# Patient Record
Sex: MALE | Race: WHITE | NOT HISPANIC OR LATINO | URBAN - METROPOLITAN AREA
[De-identification: names, ages, dates, MRNs, and addresses within clinical notes are randomized per-mention and may not be internally consistent; named-entity substitution may affect disease eponyms.]

---

## 2018-11-21 ENCOUNTER — APPOINTMENT (RX ONLY)
Dept: URBAN - METROPOLITAN AREA CLINIC 344 | Facility: CLINIC | Age: 63
Setting detail: DERMATOLOGY
End: 2018-11-21

## 2018-11-21 DIAGNOSIS — L40.0 PSORIASIS VULGARIS: ICD-10-CM | Status: INADEQUATELY CONTROLLED

## 2018-11-21 DIAGNOSIS — Z85.828 PERSONAL HISTORY OF OTHER MALIGNANT NEOPLASM OF SKIN: ICD-10-CM | Status: RESOLVED

## 2018-11-21 DIAGNOSIS — D485 NEOPLASM OF UNCERTAIN BEHAVIOR OF SKIN: ICD-10-CM

## 2018-11-21 PROBLEM — C44.529 SQUAMOUS CELL CARCINOMA OF SKIN OF OTHER PART OF TRUNK: Status: ACTIVE | Noted: 2018-11-21

## 2018-11-21 PROBLEM — D48.5 NEOPLASM OF UNCERTAIN BEHAVIOR OF SKIN: Status: ACTIVE | Noted: 2018-11-21

## 2018-11-21 PROCEDURE — 99203 OFFICE O/P NEW LOW 30 MIN: CPT | Mod: 25

## 2018-11-21 PROCEDURE — ? COSENTYX INITIATION

## 2018-11-21 PROCEDURE — ? BIOPSY BY SHAVE METHOD

## 2018-11-21 PROCEDURE — ? ORDER TESTS

## 2018-11-21 PROCEDURE — ? OTHER

## 2018-11-21 PROCEDURE — 11100: CPT

## 2018-11-21 PROCEDURE — ? COUNSELING

## 2018-11-21 ASSESSMENT — BSA PSORIASIS: % BODY COVERED IN PSORIASIS: 100

## 2018-11-21 ASSESSMENT — LOCATION SIMPLE DESCRIPTION DERM
LOCATION SIMPLE: LEFT EYEBROW
LOCATION SIMPLE: RIGHT UPPER BACK

## 2018-11-21 ASSESSMENT — LOCATION DETAILED DESCRIPTION DERM
LOCATION DETAILED: LEFT CENTRAL EYEBROW
LOCATION DETAILED: RIGHT SUPERIOR MEDIAL UPPER BACK

## 2018-11-21 ASSESSMENT — PGA PSORIASIS: PGA PSORIASIS: SEVERE (SEVERE PLAQUE ELEVATION, DUSKY TO DEEP ERYTHEMA, VERY THICK TENACIOUS SCALE PREDOMINATES)

## 2018-11-21 ASSESSMENT — LOCATION ZONE DERM
LOCATION ZONE: TRUNK
LOCATION ZONE: FACE

## 2018-11-21 NOTE — PROCEDURE: OTHER
Other (Free Text): Photo taken today and Telfa bandage applied . Dr. Soares will be contacted regarding surgical site by [facility]. Other (Free Text): Photo taken today and Telfa bandage applied . Dr. Soares will be contacted regarding surgical site by [facility].

## 2018-11-21 NOTE — PROCEDURE: OTHER
Other (Free Text): Patient has had over 60 SCC Removed. Most recently two weeks ago with Dr. Mirela Soares. (Right forearm and right hip) patient has a history of metastatic SCC (3 years ago) in left groin that was treated with chemo and radiation. He is being monitored regularly by oncologist at Cancer center. Surgery could not be performed due to proximity of femoral artery .

## 2018-11-21 NOTE — PROCEDURE: BIOPSY BY SHAVE METHOD
Render Post-Care Instructions In Note?: no
Notification Instructions: Patient will be notified of biopsy results. However, patient instructed to call the office if not contacted within 2 weeks.
Type Of Destruction Used: Curettage
Anesthesia Volume In Cc (Will Not Render If 0): 1
Cryotherapy Text: The wound bed was treated with cryotherapy after the biopsy was performed.
Anesthesia Type: 2% lidocaine without epinephrine
Electrodesiccation And Curettage Text: The wound bed was treated with electrodesiccation and curettage after the biopsy was performed.
Wound Care: Aquaphor
Dressing: bandage
Curettage Text: The wound bed was treated with curettage after the biopsy was performed.
Billing Type: Third-Party Bill
Biopsy Method: Dermablade
Silver Nitrate Text: The wound bed was treated with silver nitrate after the biopsy was performed.
Detail Level: Detailed
Biopsy Type: H and E
Electrodesiccation Text: The wound bed was treated with electrodesiccation after the biopsy was performed.
Post-Care Instructions: I reviewed with the patient in detail post-care instructions. Patient is to keep the biopsy site dry overnight, and then apply bacitracin once daily until healed. Patient may apply hydrogen peroxide soaks to remove any crusting.
Size Of Lesion In Cm: 0
Hemostasis: Electrocautery
Was A Bandage Applied: Yes
Consent: Verbal consent was obtained and risks were reviewed including but not limited to scarring, infection, bleeding, scabbing, incomplete removal, nerve damage and allergy to anesthesia.
Depth Of Biopsy: dermis

## 2018-11-21 NOTE — HPI: EVALUATION OF SKIN LESION(S)
Hpi Title: Evaluation of Skin Lesions
How Severe Are Your Spot(S)?: severe
Have Your Spot(S) Been Treated In The Past?: has been treated
Additional History: Patient has had over 60 squamous cell cancers. patiwnt suffered Metastatic squamous cell carcinoma  in the left groin 2015. This lesion was too close to the femoral artery and is being monitored by oncologist at the cancer cancer. He rece therapy and radiation.  He states he is P[employer] treatments for many years due to his severe plaque psoriasis . He has been on the following biologics throughout the course of his illness. Enbrel,Humira,Stelara and Remicade. Due to the use of Remicade patient had severe infection with in 24 hours of use and had to have a toe amputated . He had. SCC on the left thigh in March and suffered an arterial bleed. He most recently had surgery with Dr. Mirela Soares  two weeks ago on  the right forearm and right hip and margins were cleared. He is currently using Triamcinolone cream to treat his severe plaque psoriasis

## 2018-11-21 NOTE — PROCEDURE: COSENTYX INITIATION
Is Soriatane Contraindicated?: No
Detail Level: Zone
Cosentyx Dosing: 300 mg SC week 0, 1, 2, 3, and 4 then every 4 weeks after that
Diagnosis (Required): Psoriasis
Cosentyx Monitoring Guidelines: A yearly test for tuberculosis is required while taking Cosentyx.
Pregnancy And Lactation Warning Text: This medication is Pregnancy Category B and is considered safe during pregnancy. It is unknown if this medication is excreted in breast milk.

## 2018-11-21 NOTE — PROCEDURE: OTHER
Other (Free Text): Referred to DE for possible options given his history of failure/problems with previous biologics.  Would want to consider using Acitretin in conjunction with biologic.  \\nPatient is currently using Triamcinolone cream

## 2018-11-21 NOTE — PROCEDURE: ORDER TESTS
Billing Type: Third-Party Bill
Bill For Surgical Tray: no
Billing Type: Client Bill
Expected Date Of Service: 11/21/2018

## 2018-11-26 ENCOUNTER — RX ONLY (OUTPATIENT)
Age: 63
Setting detail: RX ONLY
End: 2018-11-26

## 2018-11-26 RX ORDER — DOXYCYCLINE HYCLATE 100 MG/1
TABLET, COATED ORAL
Qty: 20 | Refills: 0 | Status: ERX

## 2018-12-11 ENCOUNTER — APPOINTMENT (RX ONLY)
Dept: URBAN - METROPOLITAN AREA CLINIC 347 | Facility: CLINIC | Age: 63
Setting detail: DERMATOLOGY
End: 2018-12-11

## 2018-12-11 DIAGNOSIS — A49.02 METHICILLIN RESISTANT STAPHYLOCOCCUS AUREUS INFECTION, UNSPECIFIED SITE: ICD-10-CM

## 2018-12-11 DIAGNOSIS — L40.0 PSORIASIS VULGARIS: ICD-10-CM

## 2018-12-11 PROBLEM — B95.62 METHICILLIN RESISTANT STAPHYLOCOCCUS AUREUS INFECTION AS THE CAUSE OF DISEASES CLASSIFIED ELSEWHERE: Status: ACTIVE | Noted: 2018-12-11

## 2018-12-11 PROCEDURE — 99214 OFFICE O/P EST MOD 30 MIN: CPT

## 2018-12-11 PROCEDURE — ? OTHER

## 2018-12-11 PROCEDURE — ? ORDER TESTS

## 2018-12-11 PROCEDURE — ? COUNSELING

## 2018-12-11 ASSESSMENT — PGA PSORIASIS: PGA PSORIASIS: SEVERE (SEVERE PLAQUE ELEVATION, DUSKY TO DEEP ERYTHEMA, VERY THICK TENACIOUS SCALE PREDOMINATES)

## 2018-12-11 ASSESSMENT — LOCATION SIMPLE DESCRIPTION DERM
LOCATION SIMPLE: ABDOMEN
LOCATION SIMPLE: RIGHT UPPER BACK

## 2018-12-11 ASSESSMENT — BSA PSORIASIS: % BODY COVERED IN PSORIASIS: 99

## 2018-12-11 ASSESSMENT — LOCATION DETAILED DESCRIPTION DERM
LOCATION DETAILED: RIGHT SUPERIOR MEDIAL UPPER BACK
LOCATION DETAILED: RIGHT LATERAL ABDOMEN

## 2018-12-11 ASSESSMENT — LOCATION ZONE DERM: LOCATION ZONE: TRUNK

## 2018-12-11 NOTE — PROCEDURE: OTHER
Other (Free Text): Previously failed, MTX, psoriatane, remicade, Humira and Stelara
Detail Level: Detailed
Note Text (......Xxx Chief Complaint.): This diagnosis correlates with the

## 2018-12-19 ENCOUNTER — APPOINTMENT (RX ONLY)
Dept: URBAN - METROPOLITAN AREA CLINIC 344 | Facility: CLINIC | Age: 63
Setting detail: DERMATOLOGY
End: 2018-12-19

## 2018-12-19 DIAGNOSIS — L40.0 PSORIASIS VULGARIS: ICD-10-CM

## 2018-12-19 PROBLEM — M12.9 ARTHROPATHY, UNSPECIFIED: Status: ACTIVE | Noted: 2018-12-19

## 2018-12-19 PROCEDURE — ? COUNSELING

## 2018-12-19 PROCEDURE — ? TREATMENT REGIMEN

## 2018-12-19 PROCEDURE — ? TALTZ INITIATION

## 2018-12-19 PROCEDURE — 99213 OFFICE O/P EST LOW 20 MIN: CPT

## 2018-12-19 PROCEDURE — ? OTHER

## 2018-12-19 ASSESSMENT — PGA PSORIASIS: PGA PSORIASIS: SEVERE (SEVERE PLAQUE ELEVATION, DUSKY TO DEEP ERYTHEMA, VERY THICK TENACIOUS SCALE PREDOMINATES)

## 2018-12-19 ASSESSMENT — BSA PSORIASIS: % BODY COVERED IN PSORIASIS: 95

## 2018-12-19 NOTE — PROCEDURE: OTHER
Other (Free Text): D/w patient addition of acitretin for his history of multiple SCCs.  Clarified with pt that this did not work for his psoriasis, but was not on any length of time.  Will see how first month of Taltz goes and then add in.  Will need ED&C of SCCis L forehead.

## 2018-12-19 NOTE — PROCEDURE: TREATMENT REGIMEN
Detail Level: Simple
Action 1: Start
Other Instructions: Enrollment form completed; will fax upon lab results\\n\\nPatient will need labs done annually & patient will need to follow-up with PCP for wellness exam annually
Action 2: Continue

## 2018-12-19 NOTE — PROCEDURE: TALTZ INITIATION
Pregnancy And Lactation Warning Text: The risk during pregnancy and breastfeeding is uncertain with this medication.
Is Cyclosporine Contraindicated?: No
Detail Level: Zone
Diagnosis (Required): Psoriasis
Taltz Dosing: 160mg SC x 1 at weeks 0 then 80mg SC at weeks 2, 4, 6, 8, 10 and 12 then 80mg SC every four weeks
Taltz Monitoring Guidelines: A yearly test for tuberculosis is required while taking Taltz.

## 2018-12-19 NOTE — PROCEDURE: OTHER
Other (Free Text): Two Samples Injected today\\nLot #- [id number]\\nExp- 12/2018\\n\\nLot#- [id number]\\nExp- 12/2018 Other (Free Text): Two Samples Injected today\\nLot #- [id number]\\nExp- 12/2018\\n\\nLot#- [id number]\\nExp- 12/2018

## 2019-01-02 ENCOUNTER — APPOINTMENT (RX ONLY)
Dept: URBAN - METROPOLITAN AREA CLINIC 344 | Facility: CLINIC | Age: 64
Setting detail: DERMATOLOGY
End: 2019-01-02

## 2019-01-02 DIAGNOSIS — D485 NEOPLASM OF UNCERTAIN BEHAVIOR OF SKIN: ICD-10-CM

## 2019-01-02 DIAGNOSIS — L40.0 PSORIASIS VULGARIS: ICD-10-CM

## 2019-01-02 PROBLEM — E13.9 OTHER SPECIFIED DIABETES MELLITUS WITHOUT COMPLICATIONS: Status: ACTIVE | Noted: 2019-01-02

## 2019-01-02 PROBLEM — D48.5 NEOPLASM OF UNCERTAIN BEHAVIOR OF SKIN: Status: ACTIVE | Noted: 2019-01-02

## 2019-01-02 PROBLEM — D04.39 CARCINOMA IN SITU OF SKIN OF OTHER PARTS OF FACE: Status: ACTIVE | Noted: 2019-01-02

## 2019-01-02 PROCEDURE — 99213 OFFICE O/P EST LOW 20 MIN: CPT

## 2019-01-02 PROCEDURE — ? TREATMENT REGIMEN

## 2019-01-02 PROCEDURE — ? OTHER

## 2019-01-02 PROCEDURE — ? COUNSELING

## 2019-01-02 PROCEDURE — ? DEFER

## 2019-01-02 PROCEDURE — ? TALTZ MONITORING

## 2019-01-02 ASSESSMENT — PGA PSORIASIS: PGA PSORIASIS: MILD (MILD PLAQUE ELEVATION, LIGHT ERYTHEMA, FINE SCALE PREDOMINATES)

## 2019-01-02 ASSESSMENT — LOCATION SIMPLE DESCRIPTION DERM: LOCATION SIMPLE: RIGHT FOREHEAD

## 2019-01-02 ASSESSMENT — BSA PSORIASIS: % BODY COVERED IN PSORIASIS: 85

## 2019-01-02 ASSESSMENT — LOCATION ZONE DERM: LOCATION ZONE: FACE

## 2019-01-02 ASSESSMENT — LOCATION DETAILED DESCRIPTION DERM: LOCATION DETAILED: RIGHT FOREHEAD

## 2019-01-02 NOTE — PROCEDURE: TREATMENT REGIMEN
Continue Regimen: Jessica
Action 3: Continue
Detail Level: Simple
Other Instructions: Patient will need labs done annually & patient will need to follow-up with PCP for wellness exam annually
Detail Level: Zone
Plan: Will plan to start Acetrein 25mg-Take one tablet once daily once he is more established on Taltz.

## 2019-01-02 NOTE — PROCEDURE: OTHER
Other (Free Text): sample 80mg injected today in the abdomen\\nLot: [id number]\\nExp: 12/2018\\n\\nPhotos taken today.
Note Text (......Xxx Chief Complaint.): This diagnosis correlates with the
Detail Level: Detailed
Other (Free Text): Plan to treat with ED&C at 2-4 weeks follow up
Other (Free Text): Photo taken today

## 2019-01-16 ENCOUNTER — APPOINTMENT (RX ONLY)
Dept: URBAN - METROPOLITAN AREA CLINIC 344 | Facility: CLINIC | Age: 64
Setting detail: DERMATOLOGY
End: 2019-01-16

## 2019-01-16 DIAGNOSIS — L40.0 PSORIASIS VULGARIS: ICD-10-CM | Status: IMPROVED

## 2019-01-16 DIAGNOSIS — D485 NEOPLASM OF UNCERTAIN BEHAVIOR OF SKIN: ICD-10-CM

## 2019-01-16 PROBLEM — D48.5 NEOPLASM OF UNCERTAIN BEHAVIOR OF SKIN: Status: ACTIVE | Noted: 2019-01-16

## 2019-01-16 PROBLEM — D04.39 CARCINOMA IN SITU OF SKIN OF OTHER PARTS OF FACE: Status: ACTIVE | Noted: 2019-01-16

## 2019-01-16 PROBLEM — L30.9 DERMATITIS, UNSPECIFIED: Status: ACTIVE | Noted: 2019-01-16

## 2019-01-16 PROCEDURE — ? CURETTAGE AND DESTRUCTION

## 2019-01-16 PROCEDURE — ? TREATMENT REGIMEN

## 2019-01-16 PROCEDURE — 17283 DSTR MAL LS F/E/E/N/L/M2.1-3: CPT

## 2019-01-16 PROCEDURE — ? BIOPSY BY SHAVE METHOD

## 2019-01-16 PROCEDURE — 99213 OFFICE O/P EST LOW 20 MIN: CPT | Mod: 25

## 2019-01-16 PROCEDURE — 11102 TANGNTL BX SKIN SINGLE LES: CPT | Mod: 59

## 2019-01-16 PROCEDURE — ? COUNSELING

## 2019-01-16 PROCEDURE — ? TALTZ MONITORING

## 2019-01-16 ASSESSMENT — PGA PSORIASIS: PGA PSORIASIS: MODERATE (MODERATE PLAQUE ELEVATION, MODERATE ERYTHEMA, COARSE SCALE PREDOMINATES)

## 2019-01-16 ASSESSMENT — LOCATION ZONE DERM: LOCATION ZONE: FACE

## 2019-01-16 ASSESSMENT — BSA PSORIASIS: % BODY COVERED IN PSORIASIS: 85

## 2019-01-16 ASSESSMENT — LOCATION DETAILED DESCRIPTION DERM: LOCATION DETAILED: RIGHT SUPERIOR FOREHEAD

## 2019-01-16 ASSESSMENT — LOCATION SIMPLE DESCRIPTION DERM: LOCATION SIMPLE: RIGHT FOREHEAD

## 2019-01-16 NOTE — PROCEDURE: BIOPSY BY SHAVE METHOD
Dressing: bandage
Size Of Lesion In Cm: 0
Cryotherapy Text: The wound bed was treated with cryotherapy after the biopsy was performed.
Depth Of Biopsy: dermis
Silver Nitrate Text: The wound bed was treated with silver nitrate after the biopsy was performed.
Post-Care Instructions: I reviewed with the patient in detail post-care instructions. Patient is to keep the biopsy site dry overnight, and then apply bacitracin once daily until healed. Patient may apply hydrogen peroxide soaks to remove any crusting.
Biopsy Method: Dermablade
Notification Instructions: Patient will be notified of biopsy results. However, patient instructed to call the office if not contacted within 2 weeks.
Electrodesiccation Text: The wound bed was treated with electrodesiccation after the biopsy was performed.
Curettage Text: The wound bed was treated with curettage after the biopsy was performed.
Electrodesiccation And Curettage Text: The wound bed was treated with electrodesiccation and curettage after the biopsy was performed.
Type Of Destruction Used: Curettage
Bill 30207 For Specimen Handling/Conveyance To Laboratory?: no
Anesthesia Type: 1% lidocaine with 1:100,000 epinephrine and a 1:10 solution of 8.4% sodium bicarbonate
Wound Care: Aquaphor
Biopsy Type: H and E
Destruction After The Procedure: Yes
Anesthesia Volume In Cc (Will Not Render If 0): 1
Billing Type: Third-Party Bill
Hemostasis: Electrocautery
Detail Level: Detailed
Consent: Verbal consent was obtained and risks were reviewed including but not limited to scarring, infection, bleeding, scabbing, incomplete removal, nerve damage and allergy to anesthesia.

## 2019-01-16 NOTE — PROCEDURE: TALTZ MONITORING
Add High Risk Medication Management Associated Diagnosis?: Yes
Comments: Dose week 4 today (80 mg SC administered in office by patient).
Detail Level: Simple

## 2019-01-16 NOTE — PROCEDURE: TREATMENT REGIMEN
Action 2: Continue
Other Instructions: Patient will need labs done annually & patient will need to follow-up with PCP for wellness exam annually
Continue Regimen: Jessica
Detail Level: Simple

## 2019-02-13 ENCOUNTER — APPOINTMENT (RX ONLY)
Dept: URBAN - METROPOLITAN AREA CLINIC 344 | Facility: CLINIC | Age: 64
Setting detail: DERMATOLOGY
End: 2019-02-13

## 2019-02-13 DIAGNOSIS — L40.0 PSORIASIS VULGARIS: ICD-10-CM | Status: WORSENING

## 2019-02-13 PROBLEM — L29.8 OTHER PRURITUS: Status: ACTIVE | Noted: 2019-02-13

## 2019-02-13 PROBLEM — Z85.828 PERSONAL HISTORY OF OTHER MALIGNANT NEOPLASM OF SKIN: Status: ACTIVE | Noted: 2019-02-13

## 2019-02-13 PROBLEM — D04.39 CARCINOMA IN SITU OF SKIN OF OTHER PARTS OF FACE: Status: ACTIVE | Noted: 2019-02-13

## 2019-02-13 PROCEDURE — ? TREATMENT REGIMEN

## 2019-02-13 PROCEDURE — ? COUNSELING

## 2019-02-13 PROCEDURE — ? TALTZ MONITORING

## 2019-02-13 PROCEDURE — 99213 OFFICE O/P EST LOW 20 MIN: CPT

## 2019-02-13 ASSESSMENT — PGA PSORIASIS: PGA PSORIASIS: MARKED (MARKED PLAQUE ELEVATION, BRIGHT ERYTHEMA, THICK NONTENACIOUS SCALE PREDOMINATES)

## 2019-02-13 ASSESSMENT — BSA PSORIASIS: % BODY COVERED IN PSORIASIS: 85

## 2019-02-13 NOTE — PROCEDURE: TREATMENT REGIMEN
Action 1: Continue
Continue Regimen: Jessica
Other Instructions: Patient will need labs done annually & patient will need to follow-up with PCP for wellness exam annually
Detail Level: Simple

## 2019-02-13 NOTE — PROCEDURE: MIPS QUALITY
Quality 410: Psoriasis Clinical Response To Oral Systemic Or Biologic Mediations: Psoriasis Assessment Tool Documented, Met Specified Benchmark
Quality 265: Biopsy Follow-Up: Biopsy results reviewed, communicated, tracked, and documented
Detail Level: Detailed
Quality 337: Tuberculosis Prevention For Psoriasis And Psoriatic Arthritis Patients On A Biological Immune Response Modifier: Patient has a documented negative annual TB screening.

## 2019-02-13 NOTE — PROCEDURE: TALTZ MONITORING
Comments: Patient due for week 8 injection today. Patient currently undergoing treatment at [facility] for skin infection on R LE. [facility] to consult DE regarding whether or not patient will administer injection. Patient denies any fever or chills.
Detail Level: Simple
Add High Risk Medication Management Associated Diagnosis?: Yes

## 2019-03-20 ENCOUNTER — APPOINTMENT (RX ONLY)
Dept: URBAN - METROPOLITAN AREA CLINIC 344 | Facility: CLINIC | Age: 64
Setting detail: DERMATOLOGY
End: 2019-03-20

## 2019-03-20 DIAGNOSIS — L40.0 PSORIASIS VULGARIS: ICD-10-CM | Status: IMPROVED

## 2019-03-20 DIAGNOSIS — L57.0 ACTINIC KERATOSIS: ICD-10-CM

## 2019-03-20 PROBLEM — C44.722 SQUAMOUS CELL CARCINOMA OF SKIN OF RIGHT LOWER LIMB, INCLUDING HIP: Status: ACTIVE | Noted: 2019-03-20

## 2019-03-20 PROBLEM — L85.3 XEROSIS CUTIS: Status: ACTIVE | Noted: 2019-03-20

## 2019-03-20 PROCEDURE — ? TREATMENT REGIMEN

## 2019-03-20 PROCEDURE — 17003 DESTRUCT PREMALG LES 2-14: CPT

## 2019-03-20 PROCEDURE — ? OTHER

## 2019-03-20 PROCEDURE — ? COUNSELING

## 2019-03-20 PROCEDURE — ? LIQUID NITROGEN

## 2019-03-20 PROCEDURE — ? ORDER TESTS

## 2019-03-20 PROCEDURE — 99214 OFFICE O/P EST MOD 30 MIN: CPT | Mod: 25

## 2019-03-20 PROCEDURE — ? TALTZ MONITORING

## 2019-03-20 PROCEDURE — 17000 DESTRUCT PREMALG LESION: CPT

## 2019-03-20 ASSESSMENT — PGA PSORIASIS: PGA PSORIASIS: MILD (MILD PLAQUE ELEVATION, LIGHT ERYTHEMA, FINE SCALE PREDOMINATES)

## 2019-03-20 ASSESSMENT — LOCATION SIMPLE DESCRIPTION DERM
LOCATION SIMPLE: LEFT FOREHEAD
LOCATION SIMPLE: RIGHT FOREHEAD

## 2019-03-20 ASSESSMENT — BSA PSORIASIS: % BODY COVERED IN PSORIASIS: 85

## 2019-03-20 ASSESSMENT — LOCATION DETAILED DESCRIPTION DERM
LOCATION DETAILED: LEFT INFERIOR FOREHEAD
LOCATION DETAILED: RIGHT FOREHEAD

## 2019-03-20 ASSESSMENT — LOCATION ZONE DERM: LOCATION ZONE: FACE

## 2019-03-20 NOTE — PROCEDURE: LIQUID NITROGEN
Post-Care Instructions: I reviewed with the patient in detail post-care instructions. Patient is to wear sunprotection, and avoid picking at any of the treated lesions. Pt may apply Vaseline to crusted or scabbing areas.
Number Of Freeze-Thaw Cycles: 1 freeze-thaw cycle
Duration Of Freeze Thaw-Cycle (Seconds): 5
Consent: The patient's consent was obtained including but not limited to risks of crusting, scabbing, blistering, scarring, darker or lighter pigmentary change, recurrence, incomplete removal and infection.
Render Post-Care Instructions In Note?: no
Detail Level: Detailed

## 2019-03-20 NOTE — PROCEDURE: OTHER
Other (Free Text): Patient notes recent hypotension as well as decreased urinary output and constipation. Patient to follow up with DE prior to next scheduled home injection (4/11) to assess if he is where she expects him to be at as now on monthly dosing. Patient to have blood work as ordered by his oncologist. Patient to have this set of labs tailored to include tests desired by [facility] (CBC with Diff, CMP, and Urinalysis). Patient to also have wound care notes faxed to our office. Patient also notes minor weight gain 191.6lb at today’s visit w/ leg boot  (about 5lb BW increase accounting for 10 lb boot). Pt psoriasis overall appears thinner plaques versus last visit flare *see 2/13 photos vs 3/20.  Infected wound on R foot controlled per patient but L foot wound in boot for healing.  Pt counseled he needs to see Dr. Soares for SCC on leg (2cm approx).  Pt states he will call today. Other (Free Text): Patient notes recent hypotension as well as decreased urinary output and constipation. Patient to follow up with DE prior to next scheduled home injection (4/11) to assess if he is where she expects him to be at as now on monthly dosing. Patient to have blood work as ordered by his oncologist. Patient to have this set of labs tailored to include tests desired by [facility] (CBC with Diff, CMP, and Urinalysis). Patient to also have wound care notes faxed to our office. Patient also notes minor weight gain 191.6lb at today’s visit w/ leg boot  (about 5lb BW increase accounting for 10 lb boot). Pt psoriasis overall appears thinner plaques versus last visit flare *see 2/13 photos vs 3/20.  Infected wound on R foot controlled per patient but L foot wound in boot for healing.  Pt counseled he needs to see Dr. Soares for SCC on leg (2cm approx).  Pt states he will call today.

## 2019-03-20 NOTE — PROCEDURE: TREATMENT REGIMEN
Continue Regimen: Jessica
Action 3: Continue
Detail Level: Simple
Other Instructions: Patient will need labs done annually & patient will need to follow-up with PCP for wellness exam annually

## 2019-04-04 ENCOUNTER — APPOINTMENT (RX ONLY)
Dept: URBAN - METROPOLITAN AREA CLINIC 347 | Facility: CLINIC | Age: 64
Setting detail: DERMATOLOGY
End: 2019-04-04

## 2019-04-04 DIAGNOSIS — L40.0 PSORIASIS VULGARIS: ICD-10-CM | Status: IMPROVED

## 2019-04-04 PROCEDURE — ? TREATMENT REGIMEN

## 2019-04-04 PROCEDURE — ? COUNSELING

## 2019-04-04 PROCEDURE — ? TALTZ MONITORING

## 2019-04-04 PROCEDURE — 99214 OFFICE O/P EST MOD 30 MIN: CPT

## 2019-04-04 ASSESSMENT — PGA PSORIASIS: PGA PSORIASIS: MARKED (MARKED PLAQUE ELEVATION, BRIGHT ERYTHEMA, THICK NONTENACIOUS SCALE PREDOMINATES)

## 2019-04-04 ASSESSMENT — BSA PSORIASIS: % BODY COVERED IN PSORIASIS: 50

## 2019-04-04 NOTE — PROCEDURE: TREATMENT REGIMEN
Detail Level: Simple
Other Instructions: Patient will need labs done annually & patient will need to follow-up with PCP for wellness exam annually
Action 1: Continue
Continue Regimen: Jessica
Patient/Caregiver provided printed discharge information.

## 2019-05-30 ENCOUNTER — APPOINTMENT (RX ONLY)
Dept: URBAN - METROPOLITAN AREA CLINIC 347 | Facility: CLINIC | Age: 64
Setting detail: DERMATOLOGY
End: 2019-05-30

## 2019-05-30 DIAGNOSIS — L40.0 PSORIASIS VULGARIS: ICD-10-CM | Status: IMPROVED

## 2019-05-30 PROCEDURE — ? COUNSELING

## 2019-05-30 PROCEDURE — ? TREATMENT REGIMEN

## 2019-05-30 PROCEDURE — 99213 OFFICE O/P EST LOW 20 MIN: CPT

## 2019-05-30 PROCEDURE — ? TALTZ MONITORING

## 2019-05-30 ASSESSMENT — LOCATION ZONE DERM: LOCATION ZONE: TRUNK

## 2019-05-30 ASSESSMENT — BSA PSORIASIS: % BODY COVERED IN PSORIASIS: 20

## 2019-05-30 ASSESSMENT — LOCATION SIMPLE DESCRIPTION DERM: LOCATION SIMPLE: LEFT UPPER BACK

## 2019-05-30 ASSESSMENT — LOCATION DETAILED DESCRIPTION DERM: LOCATION DETAILED: LEFT INFERIOR MEDIAL UPPER BACK

## 2019-05-30 NOTE — PROCEDURE: TREATMENT REGIMEN
Action 4: Continue
Other Instructions: Patient will need labs done annually & patient will need to follow-up with PCP for wellness exam annually\\n\\nPatient is UTD on bloodwork as of 05/2019
Samples Given: Cerecade to be applied as needed for flares
Continue Regimen: Jessica
Detail Level: Simple

## 2019-05-30 NOTE — PROCEDURE: TALTZ MONITORING
Detail Level: Simple
Add High Risk Medication Management Associated Diagnosis?: Yes
Comments: Weight- 198.8 pounds

## 2019-05-30 NOTE — PROCEDURE: MIPS QUALITY
Detail Level: Detailed
Quality 337: Tuberculosis Prevention For Psoriasis And Psoriatic Arthritis Patients On A Biological Immune Response Modifier: Patient has a documented negative annual TB screening.
Quality 402: Tobacco Use And Help With Quitting Among Adolescents: Patient screened for tobacco and is a smoker AND received Cessation Counseling
Quality 410: Psoriasis Clinical Response To Oral Systemic Or Biologic Mediations: Psoriasis Assessment Tool Documented, Met Specified Benchmark
Quality 130: Documentation Of Current Medications In The Medical Record: Current Medications Documented

## 2019-08-20 ENCOUNTER — APPOINTMENT (RX ONLY)
Dept: URBAN - METROPOLITAN AREA CLINIC 347 | Facility: CLINIC | Age: 64
Setting detail: DERMATOLOGY
End: 2019-08-20

## 2019-08-20 DIAGNOSIS — L57.0 ACTINIC KERATOSIS: ICD-10-CM

## 2019-08-20 DIAGNOSIS — L40.0 PSORIASIS VULGARIS: ICD-10-CM | Status: STABLE

## 2019-08-20 PROCEDURE — ? OTHER

## 2019-08-20 PROCEDURE — ? TREATMENT REGIMEN

## 2019-08-20 PROCEDURE — 99214 OFFICE O/P EST MOD 30 MIN: CPT | Mod: 25

## 2019-08-20 PROCEDURE — ? COUNSELING

## 2019-08-20 PROCEDURE — 17000 DESTRUCT PREMALG LESION: CPT

## 2019-08-20 PROCEDURE — ? TALTZ MONITORING

## 2019-08-20 PROCEDURE — 17003 DESTRUCT PREMALG LES 2-14: CPT

## 2019-08-20 PROCEDURE — ? LIQUID NITROGEN

## 2019-08-20 ASSESSMENT — LOCATION DETAILED DESCRIPTION DERM
LOCATION DETAILED: RIGHT FOREHEAD
LOCATION DETAILED: LEFT INFERIOR MEDIAL FOREHEAD

## 2019-08-20 ASSESSMENT — BSA PSORIASIS: % BODY COVERED IN PSORIASIS: 20

## 2019-08-20 ASSESSMENT — LOCATION SIMPLE DESCRIPTION DERM
LOCATION SIMPLE: RIGHT FOREHEAD
LOCATION SIMPLE: LEFT FOREHEAD

## 2019-08-20 ASSESSMENT — LOCATION ZONE DERM: LOCATION ZONE: FACE

## 2019-08-20 ASSESSMENT — PGA PSORIASIS: PGA PSORIASIS: MODERATE (MODERATE PLAQUE ELEVATION, MODERATE ERYTHEMA, COARSE SCALE PREDOMINATES)

## 2019-08-20 NOTE — PROCEDURE: TREATMENT REGIMEN
Detail Level: Simple
Other Instructions: Patient will need labs done annually & patient will need to follow-up with PCP for wellness exam annually
Action 2: Continue
Continue Regimen: Taltz - will change frequency to every 2 weeks.   Will alternate with samples - 4 samples given. Patient understands that this is off label use of this medication and understands that risks are unknown.  He has improved significantly on every 2 Versus 4 week therapy.

## 2019-08-20 NOTE — PROCEDURE: OTHER
Other (Free Text): Pt failed on Otezla - psychological issues \\n\\nPt defers methotrexate
Note Text (......Xxx Chief Complaint.): This diagnosis correlates with the
Detail Level: Detailed

## 2019-08-20 NOTE — PROCEDURE: LIQUID NITROGEN
Consent: The patient's consent was obtained including but not limited to risks of crusting, scabbing, blistering, scarring, darker or lighter pigmentary change, recurrence, incomplete removal and infection.
Render Post-Care Instructions In Note?: yes
Duration Of Freeze Thaw-Cycle (Seconds): 5
Post-Care Instructions: I reviewed with the patient in detail post-care instructions. Patient is to wear sunprotection, and avoid picking at any of the treated lesions. Pt may apply Aquaphor to crusted or scabbing areas.
Detail Level: Detailed
Render Note In Bullet Format When Appropriate: No

## 2019-09-04 ENCOUNTER — APPOINTMENT (RX ONLY)
Dept: URBAN - METROPOLITAN AREA CLINIC 344 | Facility: CLINIC | Age: 64
Setting detail: DERMATOLOGY
End: 2019-09-04

## 2019-09-04 DIAGNOSIS — L40.0 PSORIASIS VULGARIS: ICD-10-CM | Status: STABLE

## 2019-09-04 DIAGNOSIS — Z85.828 PERSONAL HISTORY OF OTHER MALIGNANT NEOPLASM OF SKIN: ICD-10-CM | Status: RESOLVED

## 2019-09-04 DIAGNOSIS — Z86.007 PERSONAL HISTORY OF IN-SITU NEOPLASM OF SKIN: ICD-10-CM | Status: RESOLVED

## 2019-09-04 DIAGNOSIS — L57.0 ACTINIC KERATOSIS: ICD-10-CM

## 2019-09-04 DIAGNOSIS — L81.4 OTHER MELANIN HYPERPIGMENTATION: ICD-10-CM

## 2019-09-04 DIAGNOSIS — D22 MELANOCYTIC NEVI: ICD-10-CM

## 2019-09-04 PROBLEM — D22.5 MELANOCYTIC NEVI OF TRUNK: Status: ACTIVE | Noted: 2019-09-04

## 2019-09-04 PROCEDURE — 99214 OFFICE O/P EST MOD 30 MIN: CPT | Mod: 25

## 2019-09-04 PROCEDURE — ? OTHER

## 2019-09-04 PROCEDURE — 17000 DESTRUCT PREMALG LESION: CPT

## 2019-09-04 PROCEDURE — ? LIQUID NITROGEN

## 2019-09-04 PROCEDURE — ? TREATMENT REGIMEN

## 2019-09-04 PROCEDURE — 17003 DESTRUCT PREMALG LES 2-14: CPT

## 2019-09-04 PROCEDURE — ? COUNSELING

## 2019-09-04 ASSESSMENT — LOCATION DETAILED DESCRIPTION DERM
LOCATION DETAILED: RIGHT SUPERIOR UPPER BACK
LOCATION DETAILED: LEFT FOREHEAD
LOCATION DETAILED: LEFT CENTRAL MALAR CHEEK
LOCATION DETAILED: RIGHT SUPERIOR FOREHEAD
LOCATION DETAILED: RIGHT INFERIOR MEDIAL MIDBACK
LOCATION DETAILED: RIGHT MID-UPPER BACK
LOCATION DETAILED: NASAL ROOT

## 2019-09-04 ASSESSMENT — LOCATION ZONE DERM
LOCATION ZONE: NOSE
LOCATION ZONE: TRUNK
LOCATION ZONE: FACE

## 2019-09-04 ASSESSMENT — LOCATION SIMPLE DESCRIPTION DERM
LOCATION SIMPLE: LEFT CHEEK
LOCATION SIMPLE: RIGHT FOREHEAD
LOCATION SIMPLE: NOSE
LOCATION SIMPLE: RIGHT LOWER BACK
LOCATION SIMPLE: LEFT FOREHEAD
LOCATION SIMPLE: RIGHT UPPER BACK

## 2019-09-04 ASSESSMENT — PGA PSORIASIS: PGA PSORIASIS: MARKED (MARKED PLAQUE ELEVATION, BRIGHT ERYTHEMA, THICK NONTENACIOUS SCALE PREDOMINATES)

## 2019-09-04 NOTE — PROCEDURE: OTHER
Other (Free Text): Patient has had over 60 SCC . treated by Dr. Mirela Soares. (Right forearm and right hip) patient has a history of metastatic SCC (3 years ago) in left groin that was treated with chemo and radiation. He is being monitored regularly by oncologist at Cancer center. Surgery could not be performed due to proximity of femoral artery .
Detail Level: Detailed
Other (Free Text): Treated by  [facility] 1/2019
Note Text (......Xxx Chief Complaint.): This diagnosis correlates with the

## 2019-09-04 NOTE — HPI: NON-MELANOMA SKIN CANCER F/U (HISTORY OF NMSC)
How Many Skin Cancers Have You Had?: more than one
What Is The Reason For Today's Visit?: Follow Up Non-Melanoma Skin Cancer
Additional History: Patient being treated by DE for PSO and increased Taltz dosage to every two weeks . Patient having significant flaring of PSO . he reports no new lesions and keeps

## 2020-02-28 ENCOUNTER — RX ONLY (OUTPATIENT)
Age: 65
Setting detail: RX ONLY
End: 2020-02-28

## 2020-02-28 RX ORDER — IXEKIZUMAB 80 MG/ML
INJECTION, SOLUTION SUBCUTANEOUS
Qty: 2 | Refills: 5 | Status: ERX | COMMUNITY
Start: 2020-02-28

## 2024-04-24 NOTE — PROCEDURE: CURETTAGE AND DESTRUCTION
Anesthesia Volume In Cc: 4
Prior to Admission
Hide Accession Number?: No
Size Of Lesion In Cm: 2.7
Additional Information: (Optional): The wound was cleaned, and a pressure dressing was applied.  The patient received detailed post-op instructions.
Bill As A Line Item Or As Units: Line Item
Consent was obtained from the patient. The risks, benefits and alternatives to therapy were discussed in detail. Specifically, the risks of infection, scarring, bleeding, prolonged wound healing, nerve injury, incomplete removal, allergy to anesthesia and recurrence were addressed. Alternatives to ED&C, such as: surgical removal and XRT were also discussed.  Prior to the procedure, the treatment site was clearly identified and confirmed by the patient. All components of Universal Protocol/PAUSE Rule completed.
What Was Performed First?: Curettage
Anesthesia Type: 1% lidocaine with epinephrine and a 1:10 solution of 8.4% sodium bicarbonate
Post-Care Instructions: I reviewed with the patient in detail post-care instructions. Patient is to keep the area dry for 48 hours, and not to engage in any swimming until the area is healed. Should the patient develop any fevers, chills, bleeding, severe pain patient will contact the office immediately.
Detail Level: Detailed
Number Of Curettages: 3
Total Volume (Ccs): 1
Cautery Type: electrodesiccation